# Patient Record
Sex: FEMALE | Race: WHITE
[De-identification: names, ages, dates, MRNs, and addresses within clinical notes are randomized per-mention and may not be internally consistent; named-entity substitution may affect disease eponyms.]

---

## 2019-10-29 ENCOUNTER — HOSPITAL ENCOUNTER (EMERGENCY)
Dept: HOSPITAL 75 - ER | Age: 44
Discharge: HOME | End: 2019-10-29
Payer: SELF-PAY

## 2019-10-29 VITALS — SYSTOLIC BLOOD PRESSURE: 127 MMHG | DIASTOLIC BLOOD PRESSURE: 94 MMHG

## 2019-10-29 VITALS — BODY MASS INDEX: 30.08 KG/M2 | HEIGHT: 64.96 IN | WEIGHT: 180.56 LBS

## 2019-10-29 DIAGNOSIS — N39.0: Primary | ICD-10-CM

## 2019-10-29 LAB
APTT PPP: YELLOW S
BACTERIA #/AREA URNS HPF: (no result) /HPF
BILIRUB UR QL STRIP: NEGATIVE
FIBRINOGEN PPP-MCNC: (no result) MG/DL
GLUCOSE UR STRIP-MCNC: NEGATIVE MG/DL
KETONES UR QL STRIP: NEGATIVE
LEUKOCYTE ESTERASE UR QL STRIP: (no result)
NITRITE UR QL STRIP: POSITIVE
PH UR STRIP: 8 [PH] (ref 5–9)
PROT UR QL STRIP: (no result)
RBC #/AREA URNS HPF: (no result) /HPF
SP GR UR STRIP: 1.01 (ref 1.02–1.02)
SQUAMOUS #/AREA URNS HPF: (no result) /HPF
WBC #/AREA URNS HPF: (no result) /HPF

## 2019-10-29 PROCEDURE — 87088 URINE BACTERIA CULTURE: CPT

## 2019-10-29 PROCEDURE — 99282 EMERGENCY DEPT VISIT SF MDM: CPT

## 2019-10-29 PROCEDURE — 81000 URINALYSIS NONAUTO W/SCOPE: CPT

## 2019-10-29 NOTE — ED GU-FEMALE
General


Chief Complaint:   - Urinary


Stated Complaint:  BLOOD IN URINE


Source:  patient


Exam Limitations:  no limitations





History of Present Illness


Date Seen by Provider:  Oct 29, 2019


Time Seen by Provider:  11:40


Initial Comments


44-year-old female who presents to the emergency room with complaints of 

intermittent blood in her urine. She reports she has also been feeling poorly 

believe she has a urinary tract infection. Last June she did have a stent placed

for kidney stones and reports that she has a large stone in her left kidney. She

reports that she has been previously seen at [facility] for her 

kidney stones. She denies fevers, nausea, vomiting, diarrhea.


Timing/Duration:  getting worse, intermittent


Radiation:  back





Allergies and Home Medications


Patient Home Medication List


Home Medication List Reviewed:  Yes





Review of Systems


Review of Systems


Constitutional:  see HPI; No chills, No fever; malaise


Genitourinary:  see HPI, dysuria, hematuria





All Other Systemes Reviewed


Negative Unless Noted:  Yes





Past Medical-Social-Family Hx


Past Med/Social Hx:  Reviewed Nursing Past Med/Soc Hx


Patient Social History


Recent Foreign Travel:  No


Contact w/Someone Who Travel:  No





Family Medical History


Reviewed Nursing Family Hx





Physical Exam


Vital Signs





Vital Signs - First Documented








 10/29/19





 11:33


 


Temp 35.8


 


Pulse 89


 


Resp 18


 


B/P (MAP) 127/94 (105)


 


Pulse Ox 97





Capillary Refill :


Height, Weight, BMI


Height: '"


Weight: lbs. oz. kg;  BMI


Method:


General Appearance:  WD/WN, no apparent distress


Cardiovascular:  normal peripheral pulses, regular rate, rhythm, no edema, no 

gallop, no JVD, no murmur


Respiratory:  chest non-tender, lungs clear, normal breath sounds, no 

respiratory distress, no accessory muscle use, respiratory distress


Gastrointestinal:  normal bowel sounds, non tender, soft, no organomegaly, no 

pulsatile mass


Extremities:  normal range of motion, normal capillary refill


Neurologic/Psychiatric:  alert, normal mood/affect, oriented x 3


Skin:  normal color, warm/dry





Progress/Results/Core Measures


Suspected Sepsis


SIRS


Temperature: 


Pulse:  


Respiratory Rate: 


 


Blood Pressure  / 


Mean:





Results/Orders


Lab Results





Laboratory Tests








Test


 10/29/19


11:47 Range/Units


 


 


Urine Color YELLOW   


 


Urine Clarity VERY CLOUDY H  


 


Urine pH 8  5-9  


 


Urine Specific Gravity 1.010 L 1.016-1.022  


 


Urine Protein 2+ H NEGATIVE  


 


Urine Glucose (UA) NEGATIVE  NEGATIVE  


 


Urine Ketones NEGATIVE  NEGATIVE  


 


Urine Nitrite POSITIVE H NEGATIVE  


 


Urine Bilirubin NEGATIVE  NEGATIVE  


 


Urine Urobilinogen NORMAL  NORMAL  MG/DL


 


Urine Leukocyte Esterase 3+ H NEGATIVE  


 


Urine RBC (Auto) 4+ H NEGATIVE  


 


Urine RBC  H  /HPF


 


Urine WBC TNTC H  /HPF


 


Urine Squamous Epithelial


Cells 5-10 


  /HPF





 


Urine Crystals NONE   /LPF


 


Urine Bacteria MODERATE H  /HPF


 


Urine Casts NONE   /LPF


 


Urine Mucus NEGATIVE   /LPF


 


Urine Culture Indicated YES   








My Orders





Orders - MAXINE TAMAYO


Ua Culture If Indicated (10/29/19 11:38)


Urine Culture (10/29/19 11:47)





Vital Signs/I&O











 10/29/19





 11:33


 


Temp 35.8


 


Pulse 89


 


Resp 18


 


B/P (MAP) 127/94 (105)


 


Pulse Ox 97





Capillary Refill :


Progress Note :  


   Time:  12:24


Progress Note


I have seen and evaluated the patient. I've informed her of her laboratory 

findings. She will be started on Macrobid and culture. She was instructed to 

follow up with primary care and establish. She agrees with plan. Plans for 

discharge, return precautions were given.





Departure


Impression





   Primary Impression:  


   Urinary tract infection


Disposition:  01 HOME, SELF-CARE


Condition:  Stable/Unchanged





Departure-Patient Inst.


Decision time for Depature:  12:25


Referrals:  


NO,LOCAL PHYSICIAN (PCP/Family)


Primary Care Physician


Patient Instructions:  LOCAL PHYSICIAN LIST, Urinary Tract Infection, Adult (DC)





Add. Discharge Instructions:  


Take medication as directed. You may use Azo over-the-counter as directed by the

packaging. Tylenol Motrin as needed for pain. Drink plenty of water to stay 

hydrated and to flush out your kidneys. Follow-up with a primary care provider 

of your choosing. Return back to the emergency room for worsening symptoms or 

concerns as needed. All discharge instructions reviewed with patient and/or 

family. Voiced understanding.


Scripts


Nitrofurantoin Monohyd/M-Cryst (Macrobid 100 mg Capsule) 100 Mg Capsule


1 TAB PO BID for 7 Days, #14 CAP


   Prov: MAXINE TAMAYO         10/29/19











MAXINE TAMAYO                  Oct 29, 2019 11:52


POS